# Patient Record
Sex: FEMALE | URBAN - METROPOLITAN AREA
[De-identification: names, ages, dates, MRNs, and addresses within clinical notes are randomized per-mention and may not be internally consistent; named-entity substitution may affect disease eponyms.]

---

## 2019-07-25 ENCOUNTER — TELEPHONE (OUTPATIENT)
Dept: CARDIOLOGY CLINIC | Age: 59
End: 2019-07-25

## 2024-03-14 ENCOUNTER — APPOINTMENT (OUTPATIENT)
Dept: RADIOLOGY | Facility: HOSPITAL | Age: 64
End: 2024-03-14
Payer: COMMERCIAL

## 2024-03-19 ENCOUNTER — HOSPITAL ENCOUNTER (OUTPATIENT)
Dept: RADIOLOGY | Facility: HOSPITAL | Age: 64
Discharge: HOME | End: 2024-03-19
Payer: COMMERCIAL

## 2024-03-19 VITALS — HEIGHT: 66 IN | WEIGHT: 230 LBS | BODY MASS INDEX: 36.96 KG/M2

## 2024-03-19 DIAGNOSIS — Z12.31 ENCOUNTER FOR SCREENING MAMMOGRAM FOR MALIGNANT NEOPLASM OF BREAST: ICD-10-CM

## 2024-03-19 PROCEDURE — 77063 BREAST TOMOSYNTHESIS BI: CPT | Performed by: RADIOLOGY

## 2024-03-19 PROCEDURE — 77067 SCR MAMMO BI INCL CAD: CPT

## 2024-03-19 PROCEDURE — 77067 SCR MAMMO BI INCL CAD: CPT | Performed by: RADIOLOGY

## 2024-08-06 DIAGNOSIS — Z12.11 COLON CANCER SCREENING: Primary | ICD-10-CM

## 2024-08-06 RX ORDER — SODIUM, POTASSIUM,MAG SULFATES 17.5-3.13G
SOLUTION, RECONSTITUTED, ORAL ORAL
Qty: 354 ML | Refills: 0 | Status: SHIPPED | OUTPATIENT
Start: 2024-08-06

## 2024-08-06 NOTE — TELEPHONE ENCOUNTER
Pended colonoscopy prep, Suprep, for approval.  Instructions placed in the mail for patient as there is no active mychart.

## 2024-09-18 RX ORDER — METOPROLOL SUCCINATE 25 MG/1
25 TABLET, EXTENDED RELEASE ORAL DAILY
COMMUNITY

## 2024-09-18 RX ORDER — AMLODIPINE BESYLATE 10 MG/1
10 TABLET ORAL
COMMUNITY
Start: 2024-06-06 | End: 2025-06-06

## 2024-09-18 RX ORDER — ASPIRIN 81 MG/1
TABLET ORAL
COMMUNITY

## 2024-09-18 RX ORDER — METOPROLOL TARTRATE 50 MG/1
50 TABLET ORAL 2 TIMES DAILY
COMMUNITY

## 2024-09-18 RX ORDER — ALBUTEROL SULFATE 90 UG/1
1 INHALANT RESPIRATORY (INHALATION) 4 TIMES DAILY PRN
COMMUNITY
Start: 2024-03-08

## 2024-09-18 RX ORDER — HYDROCHLOROTHIAZIDE 12.5 MG/1
12.5 TABLET ORAL
COMMUNITY
Start: 2024-06-06 | End: 2025-06-06

## 2024-09-18 RX ORDER — CARVEDILOL 6.25 MG/1
6.25 TABLET ORAL
COMMUNITY
End: 2024-09-27 | Stop reason: ALTCHOICE

## 2024-09-18 RX ORDER — ERGOCALCIFEROL 1.25 MG/1
1.25 CAPSULE ORAL
COMMUNITY
Start: 2024-08-05 | End: 2025-08-05

## 2024-09-18 RX ORDER — CELECOXIB 200 MG/1
CAPSULE ORAL
COMMUNITY
Start: 2024-08-19

## 2024-09-18 RX ORDER — OLMESARTAN MEDOXOMIL 40 MG/1
40 TABLET ORAL
COMMUNITY
Start: 2024-06-06 | End: 2025-06-01

## 2024-09-18 RX ORDER — AMLODIPINE BESYLATE 5 MG/1
TABLET ORAL
COMMUNITY
End: 2024-09-27 | Stop reason: ALTCHOICE

## 2024-09-18 RX ORDER — ATORVASTATIN CALCIUM 20 MG/1
20 TABLET, FILM COATED ORAL
COMMUNITY
Start: 2024-08-05 | End: 2025-08-05

## 2024-09-18 RX ORDER — OLMESARTAN MEDOXOMIL / AMLODIPINE BESYLATE / HYDROCHLOROTHIAZIDE 40; 10; 12.5 MG/1; MG/1; MG/1
1 TABLET, FILM COATED ORAL
COMMUNITY
Start: 2024-08-05 | End: 2025-08-05

## 2024-09-20 DIAGNOSIS — Z12.11 COLON CANCER SCREENING: ICD-10-CM

## 2024-09-20 RX ORDER — SODIUM, POTASSIUM,MAG SULFATES 17.5-3.13G
SOLUTION, RECONSTITUTED, ORAL ORAL
Qty: 354 ML | Refills: 0 | Status: SHIPPED | OUTPATIENT
Start: 2024-09-20

## 2024-09-20 NOTE — TELEPHONE ENCOUNTER
Pended colonoscopy prep, Suprep, for approval.  Discussed with patient over the phone that she will drink the first prep starting at 4 pm along with including ginger ale, broth, italian ice, and than will drink the last prep solution at 10 pm.  All questions were answered and patient verbalized understanding.

## 2024-09-27 ENCOUNTER — ANESTHESIA EVENT (OUTPATIENT)
Dept: GASTROENTEROLOGY | Facility: HOSPITAL | Age: 64
End: 2024-09-27
Payer: COMMERCIAL

## 2024-09-27 ENCOUNTER — ANESTHESIA (OUTPATIENT)
Dept: GASTROENTEROLOGY | Facility: HOSPITAL | Age: 64
End: 2024-09-27
Payer: COMMERCIAL

## 2024-09-27 ENCOUNTER — HOSPITAL ENCOUNTER (OUTPATIENT)
Dept: GASTROENTEROLOGY | Facility: HOSPITAL | Age: 64
Setting detail: OUTPATIENT SURGERY
Discharge: HOME | End: 2024-09-27
Payer: COMMERCIAL

## 2024-09-27 VITALS
HEIGHT: 66 IN | HEART RATE: 67 BPM | BODY MASS INDEX: 36.16 KG/M2 | OXYGEN SATURATION: 98 % | SYSTOLIC BLOOD PRESSURE: 165 MMHG | DIASTOLIC BLOOD PRESSURE: 80 MMHG | RESPIRATION RATE: 18 BRPM | TEMPERATURE: 96.8 F | WEIGHT: 225 LBS

## 2024-09-27 DIAGNOSIS — Z12.11 ENCOUNTER FOR SCREENING FOR MALIGNANT NEOPLASM OF COLON: ICD-10-CM

## 2024-09-27 DIAGNOSIS — Z12.12 ENCOUNTER FOR SCREENING FOR MALIGNANT NEOPLASM OF RECTUM: ICD-10-CM

## 2024-09-27 LAB — GLUCOSE BLD MANUAL STRIP-MCNC: 87 MG/DL (ref 74–99)

## 2024-09-27 PROCEDURE — 2500000005 HC RX 250 GENERAL PHARMACY W/O HCPCS: Performed by: ANESTHESIOLOGIST ASSISTANT

## 2024-09-27 PROCEDURE — 3700000001 HC GENERAL ANESTHESIA TIME - INITIAL BASE CHARGE: Performed by: SURGERY

## 2024-09-27 PROCEDURE — 45378 DIAGNOSTIC COLONOSCOPY: CPT | Performed by: SURGERY

## 2024-09-27 PROCEDURE — 7100000010 HC PHASE TWO TIME - EACH INCREMENTAL 1 MINUTE: Performed by: SURGERY

## 2024-09-27 PROCEDURE — 7100000009 HC PHASE TWO TIME - INITIAL BASE CHARGE: Performed by: SURGERY

## 2024-09-27 PROCEDURE — 2500000004 HC RX 250 GENERAL PHARMACY W/ HCPCS (ALT 636 FOR OP/ED): Performed by: ANESTHESIOLOGIST ASSISTANT

## 2024-09-27 PROCEDURE — 2500000004 HC RX 250 GENERAL PHARMACY W/ HCPCS (ALT 636 FOR OP/ED): Performed by: SURGERY

## 2024-09-27 PROCEDURE — 3700000002 HC GENERAL ANESTHESIA TIME - EACH INCREMENTAL 1 MINUTE: Performed by: SURGERY

## 2024-09-27 PROCEDURE — 82947 ASSAY GLUCOSE BLOOD QUANT: CPT

## 2024-09-27 PROCEDURE — G0121 COLON CA SCRN NOT HI RSK IND: HCPCS | Performed by: SURGERY

## 2024-09-27 RX ORDER — METHIMAZOLE 5 MG/1
5 TABLET ORAL
COMMUNITY

## 2024-09-27 RX ORDER — SODIUM CHLORIDE, SODIUM LACTATE, POTASSIUM CHLORIDE, CALCIUM CHLORIDE 600; 310; 30; 20 MG/100ML; MG/100ML; MG/100ML; MG/100ML
20 INJECTION, SOLUTION INTRAVENOUS CONTINUOUS
Status: DISCONTINUED | OUTPATIENT
Start: 2024-09-27 | End: 2024-09-28 | Stop reason: HOSPADM

## 2024-09-27 RX ORDER — LIDOCAINE HCL/PF 100 MG/5ML
SYRINGE (ML) INTRAVENOUS AS NEEDED
Status: DISCONTINUED | OUTPATIENT
Start: 2024-09-27 | End: 2024-09-27

## 2024-09-27 RX ORDER — PROPOFOL 10 MG/ML
INJECTION, EMULSION INTRAVENOUS AS NEEDED
Status: DISCONTINUED | OUTPATIENT
Start: 2024-09-27 | End: 2024-09-27

## 2024-09-27 SDOH — HEALTH STABILITY: MENTAL HEALTH: CURRENT SMOKER: 0

## 2024-09-27 ASSESSMENT — PAIN SCALES - GENERAL
PAINLEVEL_OUTOF10: 8
PAIN_LEVEL: 0
PAINLEVEL_OUTOF10: 6

## 2024-09-27 ASSESSMENT — COLUMBIA-SUICIDE SEVERITY RATING SCALE - C-SSRS
2. HAVE YOU ACTUALLY HAD ANY THOUGHTS OF KILLING YOURSELF?: NO
6. HAVE YOU EVER DONE ANYTHING, STARTED TO DO ANYTHING, OR PREPARED TO DO ANYTHING TO END YOUR LIFE?: NO
1. IN THE PAST MONTH, HAVE YOU WISHED YOU WERE DEAD OR WISHED YOU COULD GO TO SLEEP AND NOT WAKE UP?: NO

## 2024-09-27 ASSESSMENT — PAIN - FUNCTIONAL ASSESSMENT
PAIN_FUNCTIONAL_ASSESSMENT: 0-10
PAIN_FUNCTIONAL_ASSESSMENT: 0-10

## 2024-09-27 ASSESSMENT — PAIN DESCRIPTION - DESCRIPTORS
DESCRIPTORS: ACHING
DESCRIPTORS: ACHING

## 2024-09-27 NOTE — ANESTHESIA POSTPROCEDURE EVALUATION
Patient: Estefany Brown    Procedure Summary       Date: 09/27/24 Room / Location: [facility]    Anesthesia Start: 0819 Anesthesia Stop: 0857    Procedure: COLONOSCOPY Diagnosis:       Encounter for screening for malignant neoplasm of colon      Encounter for screening for malignant neoplasm of rectum    Scheduled Providers: Armando Ortega MD PhD Responsible Provider: Chad Roberts MD    Anesthesia Type: MAC ASA Status: 2            Anesthesia Type: MAC    Vitals Value Taken Time   /64 09/27/24 0856   Temp 36 °C (96.8 °F) 09/27/24 0856   Pulse 60 09/27/24 0856   Resp 16 09/27/24 0856   SpO2 99 % 09/27/24 0856       Anesthesia Post Evaluation    Patient location during evaluation: PACU  Patient participation: complete - patient participated  Level of consciousness: awake  Pain score: 0  Pain management: adequate  Airway patency: patent  Cardiovascular status: acceptable  Respiratory status: acceptable  Hydration status: acceptable  Postoperative Nausea and Vomiting: none        There were no known notable events for this encounter.

## 2024-09-27 NOTE — ANESTHESIA PREPROCEDURE EVALUATION
Patient: Estefany Brown    Procedure Information       Date/Time: 09/27/24 0810    Scheduled providers: Armando Ortega MD PhD    Procedure: COLONOSCOPY    Location: [facility]            Relevant Problems   No relevant active problems       Clinical information reviewed:    Allergies  Meds   Med Hx  Surg Hx  OB Status  Fam Hx  Soc Hx        NPO Detail:  NPO/Void Status  Date of Last Liquid: 09/26/24  Time of Last Liquid: 2350  Date of Last Solid: 09/26/24  Time of Last Solid: 0700         Physical Exam    Airway  Mallampati: I  TM distance: >3 FB  Neck ROM: full     Cardiovascular - normal exam     Dental - normal exam     Pulmonary - normal exam     Abdominal - normal exam             Anesthesia Plan    History of general anesthesia?: yes  History of complications of general anesthesia?: no    ASA 2     MAC     The patient is not a current smoker.  Patient was previously instructed to abstain from smoking on day of procedure.  Patient did not smoke on day of procedure.    intravenous induction   Anesthetic plan and risks discussed with patient.  Use of blood products discussed with patient who consented to blood products.    Plan discussed with [facility].

## 2024-09-27 NOTE — H&P
History Of Present Illness  Estefany Brown is a 63 y.o. female presenting for screening c-scope.    PSH: multiple orthopedic surgeries, gastric sleeve    GI s/x: none    Family GI history: none    Prior scopes: one c-scope 5 years ago, multiple polyps all benign per patient    Prep quality: good, completed prep and had clear, yellow liquid Bms at the end     Past Medical History  Past Medical History:   Diagnosis Date    Personal history of other diseases of the circulatory system     History of hypertension    Personal history of other diseases of the musculoskeletal system and connective tissue     History of arthritis       Surgical History  Past Surgical History:   Procedure Laterality Date    CARDIAC SURGERY  02/10/2015    Heart Surgery    FOOT SURGERY  02/10/2015    Foot Surgery    GALLBLADDER SURGERY  02/10/2015    Gallbladder Surgery    TOTAL KNEE ARTHROPLASTY  02/10/2015    Knee Replacement        Social History  She has no history on file for tobacco use, alcohol use, and drug use.    Family History  No family history on file.     Allergies  Morphine, Tramadol, Nickel, and Penicillins    Review of Systems   The remainder of the 12 point ROS was negative except as stated in the HPI    Physical Exam  Constitutional:       Appearance: Normal appearance. She is obese.   HENT:      Head: Normocephalic and atraumatic.      Nose: Nose normal.   Eyes:      Conjunctiva/sclera: Conjunctivae normal.   Cardiovascular:      Rate and Rhythm: Normal rate.   Pulmonary:      Effort: Pulmonary effort is normal.   Abdominal:      General: Abdomen is flat.      Palpations: Abdomen is soft.   Musculoskeletal:         General: Normal range of motion.      Cervical back: Normal range of motion.   Skin:     General: Skin is warm and dry.      Capillary Refill: Capillary refill takes less than 2 seconds.   Neurological:      General: No focal deficit present.      Mental Status: She is alert and oriented to person, place, and  time. Mental status is at baseline.   Psychiatric:         Mood and Affect: Mood normal.         Behavior: Behavior normal.         Thought Content: Thought content normal.         Judgment: Judgment normal.          Last Recorded Vitals  There were no vitals taken for this visit.    Assessment & Plan  Encounter for screening for malignant neoplasm of colon    Encounter for screening for malignant neoplasm of rectum    Estefany Brown is a 63 y.o. female presenting for screening c-scope.  - C scope today, home after  - US of rectum for questionable carcinoid tumor by Dr. Rand. Patient denies.    Tamika Cervantes MD, Msc  General Surgery           Tamika Cervantes MD

## 2025-05-13 ENCOUNTER — HOSPITAL ENCOUNTER (EMERGENCY)
Facility: HOSPITAL | Age: 65
Discharge: HOME | End: 2025-05-14
Payer: MEDICARE

## 2025-05-13 ENCOUNTER — APPOINTMENT (OUTPATIENT)
Dept: RADIOLOGY | Facility: HOSPITAL | Age: 65
End: 2025-05-13
Payer: MEDICARE

## 2025-05-13 VITALS
HEIGHT: 65 IN | OXYGEN SATURATION: 95 % | BODY MASS INDEX: 36.99 KG/M2 | HEART RATE: 60 BPM | TEMPERATURE: 97.5 F | SYSTOLIC BLOOD PRESSURE: 115 MMHG | DIASTOLIC BLOOD PRESSURE: 69 MMHG | RESPIRATION RATE: 18 BRPM | WEIGHT: 222 LBS

## 2025-05-13 DIAGNOSIS — L03.116 CELLULITIS OF LEFT LOWER LEG: Primary | ICD-10-CM

## 2025-05-13 DIAGNOSIS — M71.22 SYNOVIAL CYST OF LEFT POPLITEAL SPACE: ICD-10-CM

## 2025-05-13 PROCEDURE — 99284 EMERGENCY DEPT VISIT MOD MDM: CPT | Performed by: NURSE PRACTITIONER

## 2025-05-13 PROCEDURE — 93971 EXTREMITY STUDY: CPT

## 2025-05-13 PROCEDURE — 93971 EXTREMITY STUDY: CPT | Performed by: SURGERY

## 2025-05-13 PROCEDURE — 73590 X-RAY EXAM OF LOWER LEG: CPT | Mod: LEFT SIDE | Performed by: SURGERY

## 2025-05-13 PROCEDURE — 73590 X-RAY EXAM OF LOWER LEG: CPT | Mod: LT

## 2025-05-13 PROCEDURE — 99284 EMERGENCY DEPT VISIT MOD MDM: CPT | Mod: 25

## 2025-05-13 PROCEDURE — 2500000001 HC RX 250 WO HCPCS SELF ADMINISTERED DRUGS (ALT 637 FOR MEDICARE OP): Performed by: NURSE PRACTITIONER

## 2025-05-13 RX ORDER — IBUPROFEN 600 MG/1
600 TABLET, FILM COATED ORAL ONCE
Status: COMPLETED | OUTPATIENT
Start: 2025-05-13 | End: 2025-05-13

## 2025-05-13 RX ADMIN — IBUPROFEN 600 MG: 600 TABLET ORAL at 20:02

## 2025-05-13 ASSESSMENT — ENCOUNTER SYMPTOMS
JOINT SWELLING: 0
SHORTNESS OF BREATH: 0
WOUND: 0
CHILLS: 0
WEAKNESS: 0
FEVER: 0
NUMBNESS: 0

## 2025-05-13 ASSESSMENT — PAIN DESCRIPTION - ORIENTATION: ORIENTATION: LEFT

## 2025-05-13 ASSESSMENT — PAIN DESCRIPTION - LOCATION: LOCATION: LEG

## 2025-05-13 ASSESSMENT — PAIN - FUNCTIONAL ASSESSMENT: PAIN_FUNCTIONAL_ASSESSMENT: 0-10

## 2025-05-13 ASSESSMENT — PAIN DESCRIPTION - PAIN TYPE: TYPE: ACUTE PAIN

## 2025-05-13 ASSESSMENT — PAIN DESCRIPTION - DESCRIPTORS: DESCRIPTORS: SORE

## 2025-05-13 ASSESSMENT — PAIN SCALES - GENERAL: PAINLEVEL_OUTOF10: 6

## 2025-05-13 NOTE — ED TRIAGE NOTES
L Leg pain/soreness. State that she have a bruise from a injury. Injury was about 3 weeks ago, still in pain after all this time. Not on Blood Thinners

## 2025-05-13 NOTE — ED PROVIDER NOTES
HPI   Chief Complaint   Patient presents with    Leg Pain       HPI  This is a 64 year old female with hx of HTN presents to the Emergency Department today with complaints of left lower leg pain. Reports that she injured her left leg approx 3 weeks ago after she was getting out of her car and the door slammed against her lower leg. Has been experiencing discomfort/soreness since the injury, however now endorses swelling, increased pain and some associated redness. She denies any other injuries. Has been able to ambulate without difficulty. Denies wounds, bleeding, fever/chills, paresthesias, weakness. No hx of DVT/PE. Has taken Ibuprofen at home with some improvement. Denies hx of DM.     Review of Systems   Constitutional:  Negative for chills and fever.   Respiratory:  Negative for shortness of breath.    Cardiovascular:  Negative for chest pain.   Musculoskeletal:  Negative for gait problem and joint swelling.   Skin:  Negative for wound.   Neurological:  Negative for weakness and numbness.           Patient History   Medical History[1]  Surgical History[2]  Family History[3]  Social History[4]    Physical Exam   ED Triage Vitals [05/13/25 1847]   Temperature Heart Rate Respirations BP   36.4 °C (97.5 °F) 60 18 115/69      Pulse Ox Temp Source Heart Rate Source Patient Position   95 % Skin Monitor Sitting      BP Location FiO2 (%)     Left arm --       Physical Exam  Vitals reviewed.   Constitutional:       Appearance: Normal appearance. She is not ill-appearing.   HENT:      Head: Normocephalic and atraumatic.   Cardiovascular:      Rate and Rhythm: Normal rate and regular rhythm.      Heart sounds: Normal heart sounds.   Pulmonary:      Effort: Pulmonary effort is normal. No respiratory distress.      Breath sounds: Normal breath sounds. No wheezing, rhonchi or rales.   Musculoskeletal:         General: Swelling and tenderness present. No deformity. Normal range of motion.   Skin:     General: Skin is warm and  dry.      Findings: Erythema present. No bruising.      Comments: Moderate swelling to left lower leg. Skin intact. Erythema and warmth with some associated tenderness to medial aspect of left lower leg. +2 pedal pulses   Neurological:      General: No focal deficit present.      Mental Status: She is alert and oriented to person, place, and time.      Sensory: No sensory deficit.      Motor: No weakness.      Gait: Gait normal.           ED Course & MDM   Diagnoses as of 05/16/25 0714   Cellulitis of left lower leg   Synovial cyst of left popliteal space                 No data recorded                           XR tibia fibula left 2 views  Result Date: 5/13/2025  Interpreted By:  Jason Benavidez, STUDY: XR TIBIA FIBULA LEFT 2 VIEWS; ;  5/13/2025 8:33 pm   INDICATION: Signs/Symptoms:pain, swelling s/p injury.     COMPARISON: None.   ACCESSION NUMBER(S): [id number]   ORDERING CLINICIAN: KYMBERLY TAVERA   FINDINGS: Four views of the left lower leg.   No acute fracture or malalignment. Left knee arthroplasty partially imaged appears in standard alignment. Mild tibiotalar and moderate midfoot osteoarthropathy.   Diffuse subcutaneous edema. No subcutaneous air or unexpected soft tissue foreign body.       No evidence of acute osseous abnormality in the left lower leg.     MACRO: None   Signed by: Jason Benavidez 5/13/2025 9:06 PM Dictation workstation:   [id number]         Medical Decision Making    The patient remains clinically stable while in the ED. 64 year old female with hx of HTN presents to the ED with complaints of left lower leg pain/swelling. Reports that she injured her left leg 3 weeks ago while she was getting out of her car and the car door struck her left lower leg. She reports persistent soreness with increased swelling and redness over the last week. Patient is well appearing, afebrile resting comfortably without distress. Skin intact, moderate swelling to left lower leg with associated erythema, warmth and  tenderness to medial aspect of left lower leg. Patient was given Iburpofen for discomfort. Neurovascularly intact, good pulses/sensation. XR tib/fib did not reveal any acute osseous abnormality. US LLE pending. Plan of care will be taken over by oncoming provider at 2200. Final dispo pending US results and patient reassessment. Suspect likely cellulitis to LLE, anticipate discharge home with oral antibiotics.    Procedure  Procedures         [1]   Past Medical History:  Diagnosis Date    Personal history of other diseases of the circulatory system     History of hypertension    Personal history of other diseases of the musculoskeletal system and connective tissue     History of arthritis   [2]   Past Surgical History:  Procedure Laterality Date    CARDIAC SURGERY  02/10/2015    Heart Surgery    FOOT SURGERY  02/10/2015    Foot Surgery    GALLBLADDER SURGERY  02/10/2015    Gallbladder Surgery    TOTAL KNEE ARTHROPLASTY  02/10/2015    Knee Replacement   [3] No family history on file.  [4]   Social History  Tobacco Use    Smoking status: Not on file    Smokeless tobacco: Not on file   Substance Use Topics    Alcohol use: Not on file    Drug use: Not on file        CARISA Flores-JOHANN  05/16/25 0715

## 2025-05-14 PROCEDURE — 2500000001 HC RX 250 WO HCPCS SELF ADMINISTERED DRUGS (ALT 637 FOR MEDICARE OP)

## 2025-05-14 RX ORDER — DOXYCYCLINE 100 MG/1
100 CAPSULE ORAL 2 TIMES DAILY
Qty: 14 CAPSULE | Refills: 0 | Status: SHIPPED | OUTPATIENT
Start: 2025-05-14 | End: 2025-05-21

## 2025-05-14 RX ORDER — DOXYCYCLINE HYCLATE 100 MG
100 TABLET ORAL ONCE
Status: COMPLETED | OUTPATIENT
Start: 2025-05-14 | End: 2025-05-14

## 2025-05-14 RX ADMIN — DOXYCYCLINE HYCLATE 100 MG: 100 TABLET, COATED ORAL at 01:01

## 2025-05-14 NOTE — PROGRESS NOTES
Patient was handed off to me from the previous team. For full history, physical, and prior ED course, please see previous provider note prior to patient handoff. This is an addendum to the record.    This is a 64-year-old female presenting the ED with left lower leg pain.  Lower extremity ultrasound is pending at the time of signout. Ultrasound shows no evidence of acute DVT in the left lower extremity.  Ultrasound does show a Baker's cyst measuring up to 5.2 cm.  Left knee was placed in Ace wrap for compression for Baker's cyst.  Given patient is allergic to penicillins patient will be given prescription for doxycycline for treatment of cellulitis of the left lower leg.  Patient has been hemodynamically stable in the emergency department today.    Hospital Course/MDM:  Please see ED provider note for hospital course and MDM    Disposition: Discharge home  Patient advised to follow-up with her primary care provider for further evaluation management of her symptoms.  Prescription for doxycycline for treatment of cellulitis has been sent to patient's pharmacy.  Patient advised to take the medication as prescribed.  Strict return precautions were given.  Plan was discussed with the patient the patient understands and agrees.  Patient was discharged stable condition.    Vasquez Lezama PA-C  Emergency Medicine

## 2025-05-14 NOTE — DISCHARGE INSTRUCTIONS
Please follow-up with your primary care provider for further evaluation management of your symptoms.  Prescription for doxycycline for treatment of cellulitis has been sent to your pharmacy.  Please take medication as prescribed.  Return to the emergency department if your symptoms persist or worsen or any new symptoms began.